# Patient Record
Sex: FEMALE | URBAN - METROPOLITAN AREA
[De-identification: names, ages, dates, MRNs, and addresses within clinical notes are randomized per-mention and may not be internally consistent; named-entity substitution may affect disease eponyms.]

---

## 2017-11-01 ENCOUNTER — OFFICE (OUTPATIENT)
Dept: URBAN - METROPOLITAN AREA CLINIC 33 | Facility: CLINIC | Age: 67
End: 2017-11-01

## 2017-11-01 PROCEDURE — 00031: CPT

## 2017-11-15 ENCOUNTER — ON CAMPUS - OUTPATIENT (OUTPATIENT)
Dept: URBAN - METROPOLITAN AREA HOSPITAL 14 | Facility: HOSPITAL | Age: 67
End: 2017-11-15
Payer: COMMERCIAL

## 2017-11-15 DIAGNOSIS — K63.5 POLYP OF COLON: ICD-10-CM

## 2017-11-15 DIAGNOSIS — Z12.11 ENCOUNTER FOR SCREENING FOR MALIGNANT NEOPLASM OF COLON: ICD-10-CM

## 2017-11-15 PROCEDURE — 45380 COLONOSCOPY AND BIOPSY: CPT | Mod: PT

## 2019-11-15 ENCOUNTER — OFFICE (OUTPATIENT)
Dept: URBAN - METROPOLITAN AREA CLINIC 33 | Facility: CLINIC | Age: 69
End: 2019-11-15
Payer: COMMERCIAL

## 2019-11-15 VITALS
HEART RATE: 77 BPM | DIASTOLIC BLOOD PRESSURE: 92 MMHG | HEIGHT: 62 IN | WEIGHT: 203 LBS | SYSTOLIC BLOOD PRESSURE: 147 MMHG | TEMPERATURE: 98.1 F | DIASTOLIC BLOOD PRESSURE: 88 MMHG | SYSTOLIC BLOOD PRESSURE: 142 MMHG

## 2019-11-15 DIAGNOSIS — R10.30 LOWER ABDOMINAL PAIN, UNSPECIFIED: ICD-10-CM

## 2019-11-15 PROCEDURE — 99214 OFFICE O/P EST MOD 30 MIN: CPT

## 2019-11-15 NOTE — SERVICEHPINOTES
WILTON WOOD   is a   69  female who complains of lower abdominal pressure like pain for the past few months. She describes it as a nagging type pain. She states that she wakes up and feels okay but about 10 minutes later after moving about she starts to note this pressure like discomfort. She does not wake up in the middle of the night with pain. The pain does get better after having a BM (or passing gas) but can still linger somewhat. She notes that symptoms go away as the day goes on and she usually does not have any symptoms in afternoon or evening. She goes to bed without pain. She denies any exacerbation with meals. She has regular BSS type 4 BMs. She denies any diarrhea, constipation, incomplete evacuation. She denies any melena, rectal bleeding, loss of appetite, weight loss. She has occasional heartburn symptoms relieved by OTC antacid medication. No urinary complaints or GYN complaints. She has had a hysterectomy (1990) but ovaries were left in. She had her colonoscopy in 2017 which revealed hyperplastic polyps.

## 2021-02-03 ENCOUNTER — OFFICE (OUTPATIENT)
Dept: URBAN - METROPOLITAN AREA CLINIC 102 | Facility: CLINIC | Age: 71
End: 2021-02-03

## 2021-02-03 VITALS
DIASTOLIC BLOOD PRESSURE: 95 MMHG | HEART RATE: 74 BPM | WEIGHT: 195 LBS | HEIGHT: 62 IN | SYSTOLIC BLOOD PRESSURE: 150 MMHG | TEMPERATURE: 97.5 F

## 2021-02-03 DIAGNOSIS — R05 COUGH: ICD-10-CM

## 2021-02-03 PROCEDURE — 99213 OFFICE O/P EST LOW 20 MIN: CPT | Performed by: INTERNAL MEDICINE

## 2021-02-03 NOTE — SERVICEHPINOTES
WILTON WOOD   is a   70  female who complains of wheezing and coughing for a few months. She was started on pantoprazole as reflux was suspected as cause. She was recommended to avoid reflux triggers and have an endoscopy. She does have a history of heartburn for about 6 months triggered by certain foods. She saw Dr. Gong today and was recommended to have CXR and start trial of claritin.  Since starting PPI she has not seen any improvement in cough/wheezing. She denies any abdominal pain, nausea, vomiting, diarrhea, constipation, melena, rectal bleeding, loss of appetite, weight loss, dysphagia.

## 2021-03-16 ENCOUNTER — TELEHEALTH PROVIDED OTHER THAN IN PATIENT'S HOME (OUTPATIENT)
Dept: URBAN - METROPOLITAN AREA TELEHEALTH 3 | Facility: TELEHEALTH | Age: 71
End: 2021-03-16

## 2021-03-16 VITALS — HEIGHT: 62 IN | WEIGHT: 190 LBS

## 2021-03-16 DIAGNOSIS — R05 COUGH: ICD-10-CM

## 2021-03-16 PROCEDURE — 99214 OFFICE O/P EST MOD 30 MIN: CPT | Mod: 95 | Performed by: INTERNAL MEDICINE

## 2021-03-16 RX ORDER — ESOMEPRAZOLE MAGNESIUM 20 MG/1
20 CAPSULE, DELAYED RELEASE ORAL
Qty: 30 | Refills: 2 | Status: ACTIVE
Start: 2021-03-16

## 2021-03-16 NOTE — SERVICEHPINOTES
PATIENT VERIFIED BY DATE OF BIRTH AND NAME. Patient has been consented for this telecommunication visit.   WILTON WOOD   is a   70  female who complains of continued cough/wheezing. She was started on ppi initially without much relief.  After last visit she tried Claritin which did not help. She also had CXR which was unremarkable as per patient. She then started taking omeprazole OTC with relief, although not 100%. Symptoms are intermittent as opposed to daily since starting the OTC PPI. She denies any abdominal pain, nausea, vomiting, diarrhea, constipation, melena, rectal bleeding, loss of appetite, weight loss, dysphagia. She is scheduled for EGD next week. ROS as above, otherwise remainder of ROS is negative.

## 2021-03-22 ENCOUNTER — ON CAMPUS - OUTPATIENT (OUTPATIENT)
Dept: URBAN - METROPOLITAN AREA HOSPITAL 16 | Facility: HOSPITAL | Age: 71
End: 2021-03-22
Payer: COMMERCIAL

## 2021-03-22 DIAGNOSIS — R10.30 LOWER ABDOMINAL PAIN, UNSPECIFIED: ICD-10-CM

## 2021-03-22 DIAGNOSIS — R05 COUGH: ICD-10-CM

## 2021-03-22 DIAGNOSIS — K20.80 OTHER ESOPHAGITIS WITHOUT BLEEDING: ICD-10-CM

## 2021-03-22 PROCEDURE — 43239 EGD BIOPSY SINGLE/MULTIPLE: CPT | Performed by: INTERNAL MEDICINE

## 2021-04-23 ENCOUNTER — OFFICE (OUTPATIENT)
Dept: URBAN - METROPOLITAN AREA TELEHEALTH 3 | Facility: TELEHEALTH | Age: 71
End: 2021-04-23

## 2021-04-23 VITALS — HEIGHT: 62 IN | WEIGHT: 192 LBS

## 2021-04-23 DIAGNOSIS — R05 COUGH: ICD-10-CM

## 2021-04-23 PROCEDURE — 99214 OFFICE O/P EST MOD 30 MIN: CPT | Mod: 95 | Performed by: INTERNAL MEDICINE

## 2021-04-23 NOTE — SERVICEHPINOTES
PATIENT VERIFIED BY DATE OF BIRTH AND NAME. Patient has been consented for this telecommunication visit.   WILTON WOOD   is a   70  female who presents for followup. Since last visit she had EGD and barium swallow. EGD revealed gastritis and small sliding hiatal hernia. Esophageal biopsies revealed mild inflammation. Barium swallow revealed small hiatal hernia but no significant reflux. On  PA film, she was noted to have an enlarged heart and tortuous aorta. She has seen her PCP regarding this as well as a cardiologist. She was sent for a CT to better evaluate. She was put on "water pill" for HTN which has helped blood pressure as well as improved swelling of legs. She states PPI has helped reflux and she is being careful with diet. She is currently on PPI every other day and doing well. She states cough has been better and she thinks it might be related to allergies. She thinks being on water pill recently may have helped cough as well. She denies any abdominal pain, nausea, vomiting, melena, rectal bleeding, loss of appetite, unintentional weight loss.ROS as above, otherwise remainder of ROS is negative

## 2021-04-23 NOTE — SERVICENOTES
Patient's visit was conducted through video telecommunication. Patient consented before the start of visit as to understanding of privacy concerns, possible technological failure, and their responsibility of carrying out instructions of plan.

Patient understands and agrees with plan. QUestions/concerns addressed

## 2021-05-26 LAB — HELICOBACTER PYLORI, UREA BREATH TEST: NOT DETECTED
